# Patient Record
Sex: MALE | Race: WHITE | NOT HISPANIC OR LATINO | Employment: UNEMPLOYED | URBAN - METROPOLITAN AREA
[De-identification: names, ages, dates, MRNs, and addresses within clinical notes are randomized per-mention and may not be internally consistent; named-entity substitution may affect disease eponyms.]

---

## 2022-11-07 ENCOUNTER — HOSPITAL ENCOUNTER (EMERGENCY)
Facility: HOSPITAL | Age: 27
Discharge: HOME | End: 2022-11-07
Attending: EMERGENCY MEDICINE | Admitting: EMERGENCY MEDICINE

## 2022-11-07 VITALS
SYSTOLIC BLOOD PRESSURE: 110 MMHG | HEIGHT: 68 IN | RESPIRATION RATE: 16 BRPM | WEIGHT: 159.39 LBS | OXYGEN SATURATION: 99 % | BODY MASS INDEX: 24.16 KG/M2 | DIASTOLIC BLOOD PRESSURE: 70 MMHG | HEART RATE: 80 BPM | TEMPERATURE: 98.1 F

## 2022-11-07 DIAGNOSIS — R22.0 FACIAL SWELLING: Primary | ICD-10-CM

## 2022-11-07 DIAGNOSIS — K04.7 DENTAL ABSCESS: ICD-10-CM

## 2022-11-07 PROCEDURE — 99282 EMERGENCY DEPT VISIT SF MDM: CPT

## 2022-11-07 PROCEDURE — 25010000002 DEXAMETHASONE SODIUM PHOSPHATE 10 MG/ML SOLUTION: Performed by: NURSE PRACTITIONER

## 2022-11-07 PROCEDURE — 96372 THER/PROPH/DIAG INJ SC/IM: CPT

## 2022-11-07 RX ORDER — AMOXICILLIN AND CLAVULANATE POTASSIUM 875; 125 MG/1; MG/1
1 TABLET, FILM COATED ORAL 2 TIMES DAILY
Qty: 20 TABLET | Refills: 0 | Status: SHIPPED | OUTPATIENT
Start: 2022-11-07 | End: 2022-11-17

## 2022-11-07 RX ORDER — DEXAMETHASONE SODIUM PHOSPHATE 10 MG/ML
10 INJECTION, SOLUTION INTRAMUSCULAR; INTRAVENOUS ONCE
Status: COMPLETED | OUTPATIENT
Start: 2022-11-07 | End: 2022-11-07

## 2022-11-07 RX ADMIN — DEXAMETHASONE SODIUM PHOSPHATE 10 MG: 10 INJECTION, SOLUTION INTRAMUSCULAR; INTRAVENOUS at 10:10

## 2022-11-07 NOTE — ED PROVIDER NOTES
Subjective   History of Present Illness  Patient is a 27-year-old white male with no significant medical history who presents today with complaints of pain and swelling to the left side of his face.  He states he woke up with it yesterday.  He denies any known injury or trauma.  He states he has several bad teeth he is concerned he may have an abscess.  He denies any fever chills nausea vomiting shortness of breath difficulty breathing or swallowing or other complaint.        Review of Systems   Constitutional: Negative.    HENT: Positive for dental problem and facial swelling. Negative for sore throat, trouble swallowing and voice change.    Respiratory: Negative for shortness of breath and stridor.        No past medical history on file.    No Known Allergies    No past surgical history on file.    No family history on file.             Objective   Physical Exam  Vital signs and triage nurse note reviewed.  Constitutional: Awake, alert; well-developed and well-nourished. No acute distress is noted.  HEENT: Normocephalic, atraumatic; pupils are PERRL with intact EOM; oropharynx is pink and moist without exudate or erythema.  No drooling or pooling of oral secretions.  Poor dentition with multiple decayed teeth to the gumline.  There is pain and tenderness around tooth #11 and 12.  There is mild gingival edema noted around these teeth.  No fluctuance or drainage.  Externally there is mild edema noted over the left upper face and left side of the nose.  There is no overlying erythema.  No crepitus.  No open wounds.  No streaking.  Uvula is midline.  Neck: Supple, full range of motion without pain; no cervical lymphadenopathy. Normal phonation.  Cardiovascular: Regular rate and rhythm, normal S1-S2.  No murmur noted.  Pulmonary: Respiratory effort regular nonlabored, breath sounds clear to auscultation all fields.  Musculoskeletal: Independent range of motion of all extremities with no palpable tenderness or  edema.  Neuro: Alert oriented x3, speech is clear and appropriate, GCS 15.    Skin: Flesh tone, warm, dry, intact; no erythematous or petechial rash or lesion.      Procedures           ED Course      Labs Reviewed - No data to display  No radiology results for the last day  Medications   dexamethasone sodium phosphate injection 10 mg (has no administration in time range)                                          MDM  Number of Diagnoses or Management Options  Dental abscess  Facial swelling  Diagnosis management comments: Patient presents with pain and swelling to the left upper face with multiple decayed teeth.  Exam appears to be most consistent with dental abscess.  He is nonseptic in appearance.  He is hemodynamically stable and afebrile.  There is no drooling or pooling of oral secretions.  He is well-appearing.    He is given a dose of Decadron IM here in the ED.    Diagnosis and treatment plan discussed with patient.  Patient agreeable to plan.   I discussed findings with patient who voices understanding of discharge instructions, signs and symptoms requiring return to ED; discharged improved and in stable condition with follow up for re-evaluation.  Prescription for Augmentin.    Patient Progress  Patient progress: stable      Final diagnoses:   Facial swelling   Dental abscess       ED Disposition  ED Disposition     ED Disposition   Discharge    Condition   Stable    Comment   --             Dentist    Schedule an appointment as soon as possible for a visit            Medication List      No changes were made to your prescriptions during this visit.          Ellie Marques, BARBIE  11/07/22 0927

## 2022-11-07 NOTE — DISCHARGE INSTRUCTIONS
Take antibiotics as prescribed.  May use Tylenol or ibuprofen for pain.  Warm compresses.  Warm salt water rinses.  Follow-up with dentist as soon as possible.  Return for new or worsening symptoms.